# Patient Record
Sex: FEMALE | URBAN - METROPOLITAN AREA
[De-identification: names, ages, dates, MRNs, and addresses within clinical notes are randomized per-mention and may not be internally consistent; named-entity substitution may affect disease eponyms.]

---
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
## 2021-05-10 NOTE — PROCEDURE: COUNSELING
Based on clinical documentation indicated in your record, it appears that the patient may have the following conditions:    E66 01 Severe obesity (BMI 37 76 with diabetes, htn)    E11 9 Type 2 diabetes without complication      If this is correct, please document and assess at your next visit May 17th  [place] [address]  coding opportunities             Chart reviewed, (number of) suggestions sent to provider: 2           Patients insurance company: [employer] (Medicare [employer]vantage and Rundown)             [place] [address]  coding opportunities             Chart reviewed, (number of) suggestions sent to provider: 2     Problem listed updated  Provider Accepted, (number of) suggestions accepted: 2        Patients insurance company: [employer] (Medicare [employer]vantage and [employer]und[employer])             [place] [address]  coding opportunities             Chart reviewed, (number of) suggestions sent to provider: 2     Problem listed updated   Provider Accepted, (number of) suggestions accepted: 2        Patients insurance company: [employer] (Medicare Advantage and Rundown)
Detail Level: Detailed